# Patient Record
Sex: FEMALE | Race: WHITE | NOT HISPANIC OR LATINO
[De-identification: names, ages, dates, MRNs, and addresses within clinical notes are randomized per-mention and may not be internally consistent; named-entity substitution may affect disease eponyms.]

---

## 2017-02-20 ENCOUNTER — APPOINTMENT (OUTPATIENT)
Dept: OBGYN | Facility: CLINIC | Age: 52
End: 2017-02-20

## 2017-02-20 VITALS
DIASTOLIC BLOOD PRESSURE: 72 MMHG | BODY MASS INDEX: 20.83 KG/M2 | HEIGHT: 64 IN | WEIGHT: 122 LBS | SYSTOLIC BLOOD PRESSURE: 146 MMHG

## 2017-02-20 DIAGNOSIS — Z80.3 FAMILY HISTORY OF MALIGNANT NEOPLASM OF BREAST: ICD-10-CM

## 2017-02-20 DIAGNOSIS — G47.00 INSOMNIA, UNSPECIFIED: ICD-10-CM

## 2017-02-20 DIAGNOSIS — Z87.891 PERSONAL HISTORY OF NICOTINE DEPENDENCE: ICD-10-CM

## 2017-02-20 LAB
DATE COLLECTED: NORMAL
HEMOCCULT SP1 STL QL: NEGATIVE
QUALITY CONTROL: YES

## 2017-02-20 RX ORDER — CIPROFLOXACIN HYDROCHLORIDE 500 MG/1
500 TABLET, FILM COATED ORAL
Qty: 21 | Refills: 0 | Status: ACTIVE | COMMUNITY
Start: 2017-01-12

## 2017-02-24 LAB
CYTOLOGY CVX/VAG DOC THIN PREP: NORMAL
HPV HIGH+LOW RISK DNA PNL CVX: NEGATIVE

## 2017-03-09 ENCOUNTER — APPOINTMENT (OUTPATIENT)
Dept: OBGYN | Facility: CLINIC | Age: 52
End: 2017-03-09

## 2017-03-09 DIAGNOSIS — R93.8 ABNORMAL FINDINGS ON DIAGNOSTIC IMAGING OF OTHER SPECIFIED BODY STRUCTURES: ICD-10-CM

## 2017-03-13 PROBLEM — R93.8 THICKENED ENDOMETRIUM: Status: ACTIVE | Noted: 2017-03-13

## 2017-05-15 ENCOUNTER — APPOINTMENT (OUTPATIENT)
Dept: OBGYN | Facility: CLINIC | Age: 52
End: 2017-05-15

## 2017-07-05 ENCOUNTER — APPOINTMENT (OUTPATIENT)
Dept: OBGYN | Facility: CLINIC | Age: 52
End: 2017-07-05

## 2017-08-22 ENCOUNTER — RX RENEWAL (OUTPATIENT)
Age: 52
End: 2017-08-22

## 2021-12-06 ENCOUNTER — APPOINTMENT (OUTPATIENT)
Dept: BREAST CENTER | Facility: CLINIC | Age: 56
End: 2021-12-06

## 2023-06-06 ENCOUNTER — TRANSCRIPTION ENCOUNTER (OUTPATIENT)
Age: 58
End: 2023-06-06

## 2023-06-06 ENCOUNTER — APPOINTMENT (OUTPATIENT)
Dept: COLORECTAL SURGERY | Facility: CLINIC | Age: 58
End: 2023-06-06
Payer: COMMERCIAL

## 2023-06-06 VITALS
DIASTOLIC BLOOD PRESSURE: 81 MMHG | HEIGHT: 64 IN | WEIGHT: 132 LBS | SYSTOLIC BLOOD PRESSURE: 119 MMHG | RESPIRATION RATE: 14 BRPM | BODY MASS INDEX: 22.53 KG/M2 | HEART RATE: 123 BPM

## 2023-06-06 DIAGNOSIS — K57.80 DIVERTICULITIS OF INTESTINE, PART UNSPECIFIED, WITH PERFORATION AND ABSCESS W/OUT BLEEDING: ICD-10-CM

## 2023-06-06 PROCEDURE — 99204 OFFICE O/P NEW MOD 45 MIN: CPT

## 2023-06-06 NOTE — PHYSICAL EXAM
[Normal Breath Sounds] : Normal breath sounds [No Rash or Lesion] : No rash or lesion [Alert] : alert [Oriented to Person] : oriented to person [Oriented to Place] : oriented to place [Oriented to Time] : oriented to time [Calm] : calm [Abdomen Masses] : No abdominal masses [Abdomen Tenderness] : ~T No ~M abdominal tenderness [JVD] : no jugular venous distention  [de-identified] : looks well NAD

## 2023-06-06 NOTE — ASSESSMENT
[FreeTextEntry1] : 58 year old female with recurrent sigmoid diverticulitis including complicated episode with abscess. i have had a lengthy discussion with her and explained that given the numerous episodes of sigmoid diverticultis within a short period of time (6 months) and including a complicated episode with abscess i recoomend a laparoscopic sigmoid colon resection. risk and benefits include bleeding, infections, sepsis, dvt, pe, mi, hernias, recurrence, leak from anastamosis, need for stoma and death.

## 2023-06-06 NOTE — HISTORY OF PRESENT ILLNESS
[FreeTextEntry1] : Initial office visit for this 58 year old female with recurrent sigmoid diverticulitis with complication of abscess. she has had 4 episodes of diverticulitis since january of this year including one with abscess. she has been treated with extensive antibiotics.

## 2023-06-06 NOTE — CONSULT LETTER
[Consult Letter:] : I had the pleasure of evaluating your patient, [unfilled]. [Please see my note below.] : Please see my note below. [Consult Closing:] : Thank you very much for allowing me to participate in the care of this patient.  If you have any questions, please do not hesitate to contact me. [Sincerely,] : Sincerely, [Dear  ___] : Dear  [unfilled], [FreeTextEntry3] : Theodore Knowles MD

## 2023-08-01 ENCOUNTER — RESULT REVIEW (OUTPATIENT)
Age: 58
End: 2023-08-01

## 2023-08-01 ENCOUNTER — OUTPATIENT (OUTPATIENT)
Dept: OUTPATIENT SERVICES | Facility: HOSPITAL | Age: 58
LOS: 1 days | End: 2023-08-01
Payer: COMMERCIAL

## 2023-08-01 VITALS
HEART RATE: 94 BPM | WEIGHT: 134.04 LBS | OXYGEN SATURATION: 100 % | SYSTOLIC BLOOD PRESSURE: 149 MMHG | TEMPERATURE: 99 F | RESPIRATION RATE: 16 BRPM | HEIGHT: 63 IN | DIASTOLIC BLOOD PRESSURE: 82 MMHG

## 2023-08-01 DIAGNOSIS — Z01.818 ENCOUNTER FOR OTHER PREPROCEDURAL EXAMINATION: ICD-10-CM

## 2023-08-01 DIAGNOSIS — Z90.49 ACQUIRED ABSENCE OF OTHER SPECIFIED PARTS OF DIGESTIVE TRACT: Chronic | ICD-10-CM

## 2023-08-01 DIAGNOSIS — Z98.890 OTHER SPECIFIED POSTPROCEDURAL STATES: Chronic | ICD-10-CM

## 2023-08-01 DIAGNOSIS — Z87.19 PERSONAL HISTORY OF OTHER DISEASES OF THE DIGESTIVE SYSTEM: ICD-10-CM

## 2023-08-01 LAB
ABO RH CONFIRMATION: SIGNIFICANT CHANGE UP
ANION GAP SERPL CALC-SCNC: 3 MMOL/L — LOW (ref 5–17)
APTT BLD: 32.7 SEC — SIGNIFICANT CHANGE UP (ref 24.5–35.6)
BASOPHILS # BLD AUTO: 0.12 K/UL — SIGNIFICANT CHANGE UP (ref 0–0.2)
BASOPHILS NFR BLD AUTO: 1.4 % — SIGNIFICANT CHANGE UP (ref 0–2)
BLD GP AB SCN SERPL QL: SIGNIFICANT CHANGE UP
BUN SERPL-MCNC: 11 MG/DL — SIGNIFICANT CHANGE UP (ref 7–23)
CALCIUM SERPL-MCNC: 9.6 MG/DL — SIGNIFICANT CHANGE UP (ref 8.5–10.1)
CHLORIDE SERPL-SCNC: 105 MMOL/L — SIGNIFICANT CHANGE UP (ref 96–108)
CO2 SERPL-SCNC: 32 MMOL/L — HIGH (ref 22–31)
CREAT SERPL-MCNC: 1 MG/DL — SIGNIFICANT CHANGE UP (ref 0.5–1.3)
EGFR: 65 ML/MIN/1.73M2 — SIGNIFICANT CHANGE UP
EOSINOPHIL # BLD AUTO: 0.12 K/UL — SIGNIFICANT CHANGE UP (ref 0–0.5)
EOSINOPHIL NFR BLD AUTO: 1.4 % — SIGNIFICANT CHANGE UP (ref 0–6)
GLUCOSE SERPL-MCNC: 90 MG/DL — SIGNIFICANT CHANGE UP (ref 70–99)
HCT VFR BLD CALC: 48.3 % — HIGH (ref 34.5–45)
HGB BLD-MCNC: 16.1 G/DL — HIGH (ref 11.5–15.5)
IMM GRANULOCYTES NFR BLD AUTO: 0.3 % — SIGNIFICANT CHANGE UP (ref 0–0.9)
INR BLD: 0.97 RATIO — SIGNIFICANT CHANGE UP (ref 0.85–1.18)
LYMPHOCYTES # BLD AUTO: 1.41 K/UL — SIGNIFICANT CHANGE UP (ref 1–3.3)
LYMPHOCYTES # BLD AUTO: 15.9 % — SIGNIFICANT CHANGE UP (ref 13–44)
MCHC RBC-ENTMCNC: 30 PG — SIGNIFICANT CHANGE UP (ref 27–34)
MCHC RBC-ENTMCNC: 33.3 GM/DL — SIGNIFICANT CHANGE UP (ref 32–36)
MCV RBC AUTO: 89.9 FL — SIGNIFICANT CHANGE UP (ref 80–100)
MONOCYTES # BLD AUTO: 0.56 K/UL — SIGNIFICANT CHANGE UP (ref 0–0.9)
MONOCYTES NFR BLD AUTO: 6.3 % — SIGNIFICANT CHANGE UP (ref 2–14)
NEUTROPHILS # BLD AUTO: 6.64 K/UL — SIGNIFICANT CHANGE UP (ref 1.8–7.4)
NEUTROPHILS NFR BLD AUTO: 74.7 % — SIGNIFICANT CHANGE UP (ref 43–77)
PLATELET # BLD AUTO: 385 K/UL — SIGNIFICANT CHANGE UP (ref 150–400)
POTASSIUM SERPL-MCNC: 4 MMOL/L — SIGNIFICANT CHANGE UP (ref 3.5–5.3)
POTASSIUM SERPL-SCNC: 4 MMOL/L — SIGNIFICANT CHANGE UP (ref 3.5–5.3)
PROTHROM AB SERPL-ACNC: 11 SEC — SIGNIFICANT CHANGE UP (ref 9.5–13)
RBC # BLD: 5.37 M/UL — HIGH (ref 3.8–5.2)
RBC # FLD: 14.8 % — HIGH (ref 10.3–14.5)
SODIUM SERPL-SCNC: 140 MMOL/L — SIGNIFICANT CHANGE UP (ref 135–145)
WBC # BLD: 8.88 K/UL — SIGNIFICANT CHANGE UP (ref 3.8–10.5)
WBC # FLD AUTO: 8.88 K/UL — SIGNIFICANT CHANGE UP (ref 3.8–10.5)

## 2023-08-01 PROCEDURE — 36415 COLL VENOUS BLD VENIPUNCTURE: CPT

## 2023-08-01 PROCEDURE — 86850 RBC ANTIBODY SCREEN: CPT

## 2023-08-01 PROCEDURE — 93010 ELECTROCARDIOGRAM REPORT: CPT

## 2023-08-01 PROCEDURE — 80048 BASIC METABOLIC PNL TOTAL CA: CPT

## 2023-08-01 PROCEDURE — 85025 COMPLETE CBC W/AUTO DIFF WBC: CPT

## 2023-08-01 PROCEDURE — 85610 PROTHROMBIN TIME: CPT

## 2023-08-01 PROCEDURE — 71046 X-RAY EXAM CHEST 2 VIEWS: CPT

## 2023-08-01 PROCEDURE — 85730 THROMBOPLASTIN TIME PARTIAL: CPT

## 2023-08-01 PROCEDURE — 71046 X-RAY EXAM CHEST 2 VIEWS: CPT | Mod: 26

## 2023-08-01 PROCEDURE — 86900 BLOOD TYPING SEROLOGIC ABO: CPT

## 2023-08-01 PROCEDURE — 86901 BLOOD TYPING SEROLOGIC RH(D): CPT

## 2023-08-01 PROCEDURE — 93005 ELECTROCARDIOGRAM TRACING: CPT

## 2023-08-01 PROCEDURE — 99214 OFFICE O/P EST MOD 30 MIN: CPT | Mod: 25

## 2023-08-01 NOTE — H&P PST ADULT - GASTROINTESTINAL
details… soft/nontender/normal active bowel sounds soft/nontender/nondistended/normal active bowel sounds

## 2023-08-01 NOTE — H&P PST ADULT - NSICDXFAMILYHX_GEN_ALL_CORE_FT
FAMILY HISTORY:  Mother  Still living? Unknown  Family history of breast cancer in mother, Age at diagnosis: Age Unknown    Aunt  Still living? Unknown  Family history of breast cancer in mother, Age at diagnosis: Age Unknown

## 2023-08-01 NOTE — H&P PST ADULT - HISTORY OF PRESENT ILLNESS
59 y/o female presents to PST for scheduled colon resection laparoscopic on 8/9/23.  59 y/o female with hx HTN presents to [facility] for scheduled colon resection laparoscopic on 8/9/23.  Patient with hx of diverticulitis recent flare up 5/2023 with abscess. Patient followed up outpatient and was advised to have procedure.

## 2023-08-01 NOTE — H&P PST ADULT - PROBLEM SELECTOR PLAN 1
Pre op and chlorhexidine instructions given and explained.  Avoid NSAIDs and OTC supplements.   Patient verbalized understanding  medical consult requested by surgeon Pre op and chlorhexidine instructions given and explained.  Avoid NSAIDs and OTC supplements.   Patient verbalized understanding  medical consult requested by surgeon  no medication on the DOS due to water pill combination

## 2023-08-01 NOTE — H&P PST ADULT - ASSESSMENT
59 y/o female presents to PST for scheduled colon resection laparoscopic on 23.     CAPRINI SCORE    AGE RELATED RISK FACTORS                                                       MOBILITY RELATED FACTORS  [ ] Age 41-60 years                                            (1 Point)                  [ ] Bed rest                                                        (1 Point)  [ ] Age: 61-74 years                                           (2 Points)                [ ] Plaster cast                                                   (2 Points)  [ ] Age= 75 years                                              (3 Points)                 [ ] Bed bound for more than 72 hours                   (2 Points)    DISEASE RELATED RISK FACTORS                                               GENDER SPECIFIC FACTORS  [ ] Edema in the lower extremities                       (1 Point)                  [ ] Pregnancy                                                     (1 Point)  [ ] Varicose veins                                               (1 Point)                  [ ] Post-partum < 6 weeks                                   (1 Point)             [ ] BMI > 25 Kg/m2                                            (1 Point)                  [ ] Hormonal therapy  or oral contraception            (1 Point)                 [ ] Sepsis (in the previous month)                        (1 Point)                  [ ] History of pregnancy complications  [ ] Pneumonia or serious lung disease                                               [ ] Unexplained or recurrent                       (1 Point)           (in the previous month)                               (1 Point)  [ ] Abnormal pulmonary function test                     (1 Point)                 SURGERY RELATED RISK FACTORS  [ ] Acute myocardial infarction                              (1 Point)                 [ ]  Section                                            (1 Point)  [ ] Congestive heart failure (in the previous month)  (1 Point)                 [ ] Minor surgery                                                 (1 Point)   [ ] Inflammatory bowel disease                             (1 Point)                 [ ] Arthroscopic surgery                                        (2 Points)  [ ] Central venous access                                    (2 Points)                [ ] General surgery lasting more than 45 minutes   (2 Points)       [ ] Stroke (in the previous month)                          (5 Points)               [ ] Elective arthroplasty                                        (5 Points)            [ ] malignancy                                                             (2 points)                                                                                                                                 HEMATOLOGY RELATED FACTORS                                                 TRAUMA RELATED RISK FACTORS  [ ] Prior episodes of VTE                                     (3 Points)                 [ ] Fracture of the hip, pelvis, or leg                       (5 Points)  [ ] Positive family history for VTE                         (3 Points)                 [ ] Acute spinal cord injury (in the previous month)  (5 Points)  [ ] Prothrombin 34760 A                                      (3 Points)                 [ ] Paralysis  (less than 1 month)                          (5 Points)  [ ] Factor V Leiden                                             (3 Points)                 [ ] Multiple Trauma within 1 month                         (5 Points)  [ ] Lupus anticoagulants                                     (3 Points)                                                           [ ] Anticardiolipin antibodies                                (3 Points)                                                       [ ] High homocysteine in the blood                      (3 Points)                                             [ ] Other congenital or acquired thrombophilia       (3 Points)                                                [ ] Heparin induced thrombocytopenia                  (3 Points)                                          Total Score [          ] 57 y/o female presents to PST for scheduled colon resection laparoscopic on 23.     CAPRINI SCORE    AGE RELATED RISK FACTORS                                                       MOBILITY RELATED FACTORS  [x ] Age 41-60 years                                            (1 Point)                  [ ] Bed rest                                                        (1 Point)  [ ] Age: 61-74 years                                           (2 Points)                [ ] Plaster cast                                                   (2 Points)  [ ] Age= 75 years                                              (3 Points)                 [ ] Bed bound for more than 72 hours                   (2 Points)    DISEASE RELATED RISK FACTORS                                               GENDER SPECIFIC FACTORS  [ ] Edema in the lower extremities                       (1 Point)                  [ ] Pregnancy                                                     (1 Point)  [ ] Varicose veins                                               (1 Point)                  [ ] Post-partum < 6 weeks                                   (1 Point)             [ ] BMI > 25 Kg/m2                                            (1 Point)                  [ ] Hormonal therapy  or oral contraception            (1 Point)                 [ ] Sepsis (in the previous month)                        (1 Point)                  [ ] History of pregnancy complications  [ ] Pneumonia or serious lung disease                                               [ ] Unexplained or recurrent                       (1 Point)           (in the previous month)                               (1 Point)  [ ] Abnormal pulmonary function test                     (1 Point)                 SURGERY RELATED RISK FACTORS  [ ] Acute myocardial infarction                              (1 Point)                 [ ]  Section                                            (1 Point)  [ ] Congestive heart failure (in the previous month)  (1 Point)                 [ ] Minor surgery                                                 (1 Point)   [ ] Inflammatory bowel disease                             (1 Point)                 [ ] Arthroscopic surgery                                        (2 Points)  [ ] Central venous access                                    (2 Points)                [x ] General surgery lasting more than 45 minutes   (2 Points)       [ ] Stroke (in the previous month)                          (5 Points)               [ ] Elective arthroplasty                                        (5 Points)            [ ] malignancy                                                             (2 points)                                                                                                                                 HEMATOLOGY RELATED FACTORS                                                 TRAUMA RELATED RISK FACTORS  [ ] Prior episodes of VTE                                     (3 Points)                 [ ] Fracture of the hip, pelvis, or leg                       (5 Points)  [ ] Positive family history for VTE                         (3 Points)                 [ ] Acute spinal cord injury (in the previous month)  (5 Points)  [ ] Prothrombin 98803 A                                      (3 Points)                 [ ] Paralysis  (less than 1 month)                          (5 Points)  [ ] Factor V Leiden                                             (3 Points)                 [ ] Multiple Trauma within 1 month                         (5 Points)  [ ] Lupus anticoagulants                                     (3 Points)                                                           [ ] Anticardiolipin antibodies                                (3 Points)                                                       [ ] High homocysteine in the blood                      (3 Points)                                             [ ] Other congenital or acquired thrombophilia       (3 Points)                                                [ ] Heparin induced thrombocytopenia                  (3 Points)                                          Total Score [  3        ]  The Caprini score indicates this patient is at risk for a VTE event (score 3-5).  Most surgical patients in this group would benefit from pharmacologic prophylaxis.  The surgical team will determine the balance between VTE risk and bleeding risk

## 2023-08-01 NOTE — H&P PST ADULT - NSICDXPASTMEDICALHX_GEN_ALL_CORE_FT
PAST MEDICAL HISTORY:  History of diverticulitis      PAST MEDICAL HISTORY:  H/O osteopenia     History of diverticulitis     HTN (hypertension)

## 2023-08-02 DIAGNOSIS — Z01.818 ENCOUNTER FOR OTHER PREPROCEDURAL EXAMINATION: ICD-10-CM

## 2023-08-03 PROBLEM — Z87.19 PERSONAL HISTORY OF OTHER DISEASES OF THE DIGESTIVE SYSTEM: Chronic | Status: ACTIVE | Noted: 2023-08-01

## 2023-08-03 PROBLEM — Z87.39 PERSONAL HISTORY OF OTHER DISEASES OF THE MUSCULOSKELETAL SYSTEM AND CONNECTIVE TISSUE: Chronic | Status: ACTIVE | Noted: 2023-08-01

## 2023-08-03 PROBLEM — I10 ESSENTIAL (PRIMARY) HYPERTENSION: Chronic | Status: ACTIVE | Noted: 2023-08-01

## 2023-08-03 RX ORDER — SODIUM CHLORIDE 9 MG/ML
3 INJECTION INTRAMUSCULAR; INTRAVENOUS; SUBCUTANEOUS EVERY 8 HOURS
Refills: 0 | Status: DISCONTINUED | OUTPATIENT
Start: 2023-08-09 | End: 2023-08-11

## 2023-08-06 RX ORDER — METRONIDAZOLE 500 MG/1
500 TABLET ORAL
Qty: 6 | Refills: 0 | Status: ACTIVE | COMMUNITY
Start: 2023-08-06 | End: 1900-01-01

## 2023-08-06 RX ORDER — ERYTHROMYCIN 500 MG/1
500 TABLET, FILM COATED ORAL
Qty: 6 | Refills: 0 | Status: ACTIVE | COMMUNITY
Start: 2023-08-06 | End: 1900-01-01

## 2023-08-09 ENCOUNTER — TRANSCRIPTION ENCOUNTER (OUTPATIENT)
Age: 58
End: 2023-08-09

## 2023-08-09 ENCOUNTER — APPOINTMENT (OUTPATIENT)
Dept: COLORECTAL SURGERY | Facility: HOSPITAL | Age: 58
End: 2023-08-09

## 2023-08-09 ENCOUNTER — RESULT REVIEW (OUTPATIENT)
Age: 58
End: 2023-08-09

## 2023-08-09 ENCOUNTER — INPATIENT (INPATIENT)
Facility: HOSPITAL | Age: 58
LOS: 1 days | Discharge: HOME | DRG: 329 | End: 2023-08-11
Attending: COLON & RECTAL SURGERY | Admitting: COLON & RECTAL SURGERY
Payer: COMMERCIAL

## 2023-08-09 VITALS
WEIGHT: 132.06 LBS | OXYGEN SATURATION: 100 % | HEIGHT: 63 IN | TEMPERATURE: 98 F | DIASTOLIC BLOOD PRESSURE: 97 MMHG | HEART RATE: 100 BPM | RESPIRATION RATE: 18 BRPM | SYSTOLIC BLOOD PRESSURE: 142 MMHG

## 2023-08-09 DIAGNOSIS — Z98.890 OTHER SPECIFIED POSTPROCEDURAL STATES: Chronic | ICD-10-CM

## 2023-08-09 DIAGNOSIS — K57.80 DIVERTICULITIS OF INTESTINE, PART UNSPECIFIED, WITH PERFORATION AND ABSCESS WITHOUT BLEEDING: ICD-10-CM

## 2023-08-09 DIAGNOSIS — Z90.49 ACQUIRED ABSENCE OF OTHER SPECIFIED PARTS OF DIGESTIVE TRACT: Chronic | ICD-10-CM

## 2023-08-09 LAB
GLUCOSE BLDC GLUCOMTR-MCNC: 143 MG/DL — HIGH (ref 70–99)
GLUCOSE BLDC GLUCOMTR-MCNC: 182 MG/DL — HIGH (ref 70–99)
GLUCOSE BLDC GLUCOMTR-MCNC: 187 MG/DL — HIGH (ref 70–99)
GLUCOSE BLDC GLUCOMTR-MCNC: 200 MG/DL — HIGH (ref 70–99)

## 2023-08-09 PROCEDURE — 88304 TISSUE EXAM BY PATHOLOGIST: CPT

## 2023-08-09 PROCEDURE — 44207 L COLECTOMY/COLOPROCTOSTOMY: CPT | Mod: AS

## 2023-08-09 PROCEDURE — 99253 IP/OBS CNSLTJ NEW/EST LOW 45: CPT

## 2023-08-09 PROCEDURE — 85025 COMPLETE CBC W/AUTO DIFF WBC: CPT

## 2023-08-09 PROCEDURE — 44213 LAP MOBIL SPLENIC FL ADD-ON: CPT

## 2023-08-09 PROCEDURE — C9290: CPT

## 2023-08-09 PROCEDURE — 44207 L COLECTOMY/COLOPROCTOSTOMY: CPT

## 2023-08-09 PROCEDURE — 82962 GLUCOSE BLOOD TEST: CPT

## 2023-08-09 PROCEDURE — 44213 LAP MOBIL SPLENIC FL ADD-ON: CPT | Mod: AS

## 2023-08-09 PROCEDURE — 36415 COLL VENOUS BLD VENIPUNCTURE: CPT

## 2023-08-09 PROCEDURE — 45300 PROCTOSIGMOIDOSCOPY DX: CPT

## 2023-08-09 PROCEDURE — 83735 ASSAY OF MAGNESIUM: CPT

## 2023-08-09 PROCEDURE — 88307 TISSUE EXAM BY PATHOLOGIST: CPT | Mod: 26

## 2023-08-09 PROCEDURE — 85027 COMPLETE CBC AUTOMATED: CPT

## 2023-08-09 PROCEDURE — C1889: CPT

## 2023-08-09 PROCEDURE — C9399: CPT

## 2023-08-09 PROCEDURE — 88307 TISSUE EXAM BY PATHOLOGIST: CPT

## 2023-08-09 PROCEDURE — 88304 TISSUE EXAM BY PATHOLOGIST: CPT | Mod: 26

## 2023-08-09 PROCEDURE — 80048 BASIC METABOLIC PNL TOTAL CA: CPT

## 2023-08-09 PROCEDURE — 84100 ASSAY OF PHOSPHORUS: CPT

## 2023-08-09 RX ORDER — SODIUM CHLORIDE 9 MG/ML
1000 INJECTION, SOLUTION INTRAVENOUS
Refills: 0 | Status: DISCONTINUED | OUTPATIENT
Start: 2023-08-09 | End: 2023-08-10

## 2023-08-09 RX ORDER — CHOLECALCIFEROL (VITAMIN D3) 125 MCG
1 CAPSULE ORAL
Refills: 0 | DISCHARGE

## 2023-08-09 RX ORDER — FENTANYL CITRATE 50 UG/ML
50 INJECTION INTRAVENOUS
Refills: 0 | Status: DISCONTINUED | OUTPATIENT
Start: 2023-08-09 | End: 2023-08-09

## 2023-08-09 RX ORDER — OXYCODONE HYDROCHLORIDE 5 MG/1
10 TABLET ORAL ONCE
Refills: 0 | Status: DISCONTINUED | OUTPATIENT
Start: 2023-08-09 | End: 2023-08-09

## 2023-08-09 RX ORDER — ONDANSETRON 8 MG/1
4 TABLET, FILM COATED ORAL ONCE
Refills: 0 | Status: COMPLETED | OUTPATIENT
Start: 2023-08-09 | End: 2023-08-09

## 2023-08-09 RX ORDER — L.ACIDOPH/B.ANIMALIS/B.LONGUM 15B CELL
1 CAPSULE ORAL
Refills: 0 | DISCHARGE

## 2023-08-09 RX ORDER — CEFOTETAN DISODIUM 1 G
2 VIAL (EA) INJECTION EVERY 12 HOURS
Refills: 0 | Status: DISCONTINUED | OUTPATIENT
Start: 2023-08-09 | End: 2023-08-11

## 2023-08-09 RX ORDER — OXYCODONE HYDROCHLORIDE 5 MG/1
5 TABLET ORAL EVERY 4 HOURS
Refills: 0 | Status: DISCONTINUED | OUTPATIENT
Start: 2023-08-09 | End: 2023-08-11

## 2023-08-09 RX ORDER — VALSARTAN 80 MG/1
160 TABLET ORAL DAILY
Refills: 0 | Status: DISCONTINUED | OUTPATIENT
Start: 2023-08-09 | End: 2023-08-11

## 2023-08-09 RX ORDER — ALVIMOPAN 12 MG/1
12 CAPSULE ORAL EVERY 12 HOURS
Refills: 0 | Status: DISCONTINUED | OUTPATIENT
Start: 2023-08-09 | End: 2023-08-11

## 2023-08-09 RX ORDER — SODIUM CHLORIDE 9 MG/ML
1000 INJECTION, SOLUTION INTRAVENOUS
Refills: 0 | Status: DISCONTINUED | OUTPATIENT
Start: 2023-08-09 | End: 2023-08-09

## 2023-08-09 RX ORDER — ACETAMINOPHEN 500 MG
1000 TABLET ORAL EVERY 6 HOURS
Refills: 0 | Status: COMPLETED | OUTPATIENT
Start: 2023-08-09 | End: 2023-08-10

## 2023-08-09 RX ORDER — APREPITANT 80 MG/1
40 CAPSULE ORAL ONCE
Refills: 0 | Status: COMPLETED | OUTPATIENT
Start: 2023-08-09 | End: 2023-08-09

## 2023-08-09 RX ORDER — ONDANSETRON 8 MG/1
4 TABLET, FILM COATED ORAL ONCE
Refills: 0 | Status: DISCONTINUED | OUTPATIENT
Start: 2023-08-09 | End: 2023-08-09

## 2023-08-09 RX ORDER — ENOXAPARIN SODIUM 100 MG/ML
40 INJECTION SUBCUTANEOUS EVERY 24 HOURS
Refills: 0 | Status: DISCONTINUED | OUTPATIENT
Start: 2023-08-10 | End: 2023-08-11

## 2023-08-09 RX ORDER — OXYCODONE HYDROCHLORIDE 5 MG/1
10 TABLET ORAL EVERY 4 HOURS
Refills: 0 | Status: DISCONTINUED | OUTPATIENT
Start: 2023-08-09 | End: 2023-08-11

## 2023-08-09 RX ORDER — DENOSUMAB 60 MG/ML
60 INJECTION SUBCUTANEOUS
Refills: 0 | DISCHARGE

## 2023-08-09 RX ORDER — ALVIMOPAN 12 MG/1
12 CAPSULE ORAL ONCE
Refills: 0 | Status: COMPLETED | OUTPATIENT
Start: 2023-08-09 | End: 2023-08-09

## 2023-08-09 RX ORDER — FAMOTIDINE 10 MG/ML
20 INJECTION INTRAVENOUS ONCE
Refills: 0 | Status: COMPLETED | OUTPATIENT
Start: 2023-08-09 | End: 2023-08-09

## 2023-08-09 RX ORDER — HEPARIN SODIUM 5000 [USP'U]/ML
5000 INJECTION INTRAVENOUS; SUBCUTANEOUS ONCE
Refills: 0 | Status: COMPLETED | OUTPATIENT
Start: 2023-08-09 | End: 2023-08-09

## 2023-08-09 RX ADMIN — OXYCODONE HYDROCHLORIDE 10 MILLIGRAM(S): 5 TABLET ORAL at 11:32

## 2023-08-09 RX ADMIN — OXYCODONE HYDROCHLORIDE 10 MILLIGRAM(S): 5 TABLET ORAL at 21:00

## 2023-08-09 RX ADMIN — APREPITANT 40 MILLIGRAM(S): 80 CAPSULE ORAL at 07:48

## 2023-08-09 RX ADMIN — ALVIMOPAN 12 MILLIGRAM(S): 12 CAPSULE ORAL at 07:26

## 2023-08-09 RX ADMIN — Medication 400 MILLIGRAM(S): at 22:57

## 2023-08-09 RX ADMIN — FAMOTIDINE 20 MILLIGRAM(S): 10 INJECTION INTRAVENOUS at 18:15

## 2023-08-09 RX ADMIN — Medication 400 MILLIGRAM(S): at 17:54

## 2023-08-09 RX ADMIN — HEPARIN SODIUM 5000 UNIT(S): 5000 INJECTION INTRAVENOUS; SUBCUTANEOUS at 07:26

## 2023-08-09 RX ADMIN — FENTANYL CITRATE 50 MICROGRAM(S): 50 INJECTION INTRAVENOUS at 11:50

## 2023-08-09 RX ADMIN — FENTANYL CITRATE 50 MICROGRAM(S): 50 INJECTION INTRAVENOUS at 14:45

## 2023-08-09 RX ADMIN — OXYCODONE HYDROCHLORIDE 5 MILLIGRAM(S): 5 TABLET ORAL at 19:00

## 2023-08-09 RX ADMIN — Medication 1000 MILLIGRAM(S): at 19:00

## 2023-08-09 RX ADMIN — OXYCODONE HYDROCHLORIDE 5 MILLIGRAM(S): 5 TABLET ORAL at 16:18

## 2023-08-09 RX ADMIN — SODIUM CHLORIDE 3 MILLILITER(S): 9 INJECTION INTRAMUSCULAR; INTRAVENOUS; SUBCUTANEOUS at 21:54

## 2023-08-09 RX ADMIN — Medication 100 GRAM(S): at 22:57

## 2023-08-09 RX ADMIN — FENTANYL CITRATE 50 MICROGRAM(S): 50 INJECTION INTRAVENOUS at 13:40

## 2023-08-09 RX ADMIN — OXYCODONE HYDROCHLORIDE 10 MILLIGRAM(S): 5 TABLET ORAL at 20:31

## 2023-08-09 RX ADMIN — ONDANSETRON 4 MILLIGRAM(S): 8 TABLET, FILM COATED ORAL at 07:48

## 2023-08-09 RX ADMIN — OXYCODONE HYDROCHLORIDE 10 MILLIGRAM(S): 5 TABLET ORAL at 14:46

## 2023-08-09 RX ADMIN — FENTANYL CITRATE 50 MICROGRAM(S): 50 INJECTION INTRAVENOUS at 11:32

## 2023-08-09 RX ADMIN — ALVIMOPAN 12 MILLIGRAM(S): 12 CAPSULE ORAL at 20:31

## 2023-08-09 RX ADMIN — SODIUM CHLORIDE 125 MILLILITER(S): 9 INJECTION, SOLUTION INTRAVENOUS at 14:46

## 2023-08-09 NOTE — PATIENT PROFILE ADULT - FALL HARM RISK - UNIVERSAL INTERVENTIONS
Bed in lowest position, wheels locked, appropriate side rails in place/Call bell, personal items and telephone in reach/Instruct patient to call for assistance before getting out of bed or chair/Non-slip footwear when patient is out of bed/[place] to call system/Physically safe environment - no spills, clutter or unnecessary equipment/Purposeful Proactive Rounding/Room/bathroom lighting operational, light cord in reach

## 2023-08-09 NOTE — BRIEF OPERATIVE NOTE - NSICDXBRIEFPOSTOP_GEN_ALL_CORE_FT
POST-OP DIAGNOSIS:  Abscess of sigmoid colon due to diverticulitis 09-Aug-2023 10:56:57  Sergei Fernandes  Perforation of sigmoid colon due to diverticulitis 09-Aug-2023 10:57:13  Sergei Fernandes

## 2023-08-09 NOTE — BRIEF OPERATIVE NOTE - COMMENTS
I, ALDO Fernandes, was present with Dr. Knowles for the entirety of the procedure, assisting during all key portions of the procedure. For further details, please refer to his operative dictation.

## 2023-08-09 NOTE — BRIEF OPERATIVE NOTE - NSICDXBRIEFPREOP_GEN_ALL_CORE_FT
PRE-OP DIAGNOSIS:  Abscess of sigmoid colon due to diverticulitis 09-Aug-2023 10:56:53  Sergei Fernandes A

## 2023-08-09 NOTE — BRIEF OPERATIVE NOTE - NSICDXBRIEFPROCEDURE_GEN_ALL_CORE_FT
PROCEDURES:  Hand assisted laparoscopic sigmoid colectomy 09-Aug-2023 10:56:01  Sergei Fernandes  Mobilization of splenic flexure 09-Aug-2023 10:56:14  Sergei Fernandes  Rigid proctosigmoidoscpy 09-Aug-2023 10:56:22  Sergei Fernandes

## 2023-08-09 NOTE — BRIEF OPERATIVE NOTE - OPERATION/FINDINGS
thickened sigmoid, perforation, abscess of sigmoid diverticulitis. abscess in sigmoid mesentery in pelvis, proximal rectum with stricture

## 2023-08-09 NOTE — CONSULT NOTE ADULT - SUBJECTIVE AND OBJECTIVE BOX
C/c: h/o Diverticulitis with abscess    HPI: 58F, pmh of HTN, recurrent diverticulitis with recent flare up complicated by abscess who is admitted for elective hand assisted lap sigmoidectomy.  Hospitalist service consulted for medical comanagement.   Pt seen and examined in pacu. Felt well. Had just gotten medicated for pain. no n/v. Mccoy in place. Took valsartan-hctz this am.     ROS: all 10 systems reviewed and is as above otherwise negative.     PMH: as above  PSH: Cholecystectomy, hand surgery  F/H: Mother-breast ca  Social: Tobacco-15 pack year, quit 20 years ago, ETOH: 1-2 drinks 2-3X/week  Allergies: amoxicillin, sulfa  Home meds: see med rec    Vital Signs Last 24 Hrs  T(C): 37.2 (09 Aug 2023 11:11), Max: 37.2 (09 Aug 2023 11:11)  T(F): 99 (09 Aug 2023 11:11), Max: 99 (09 Aug 2023 11:11)  HR: 77 (09 Aug 2023 15:30) (71 - 100)  BP: 129/79 (09 Aug 2023 15:00) (111/81 - 185/85)  RR: 14 (09 Aug 2023 15:30) (10 - 18)  SpO2: 100% (09 Aug 2023 15:30) (95% - 100%)    Parameters below as of 09 Aug 2023 14:00  Patient On (Oxygen Delivery Method): nasal cannula  O2 Flow (L/min): 2    PHYSICAL EXAM:    GENERAL: Comfortable, no acute distress   HEAD:  Normocephalic, atraumatic  EYES: EOMI, PERRLA  HEENT: Moist mucous membranes  NECK: Supple, No JVD  NERVOUS SYSTEM:  Alert & Oriented X3, Motor Strength 5/5 B/L upper and lower extremities  CHEST/LUNG: Clear to auscultation bilaterally  HEART: Regular rate and rhythm  ABDOMEN: Soft, appropriate post op tenderness, midline dressing intact, lap sites intact  GENITOURINARY: Voiding, no palpable bladder  EXTREMITIES:   No clubbing, cyanosis, or edema  MUSCULOSKELETAL- No muscle tenderness, no joint tenderness  SKIN-no rash    LABS:  pending.     MEDICATIONS  (STANDING):  acetaminophen   IVPB .. 1000 milliGRAM(s) IV Intermittent every 6 hours  alvimopan 12 milliGRAM(s) Oral every 12 hours  cefoTEtan  IVPB 2 Gram(s) IV Intermittent every 12 hours  lactated ringers. 1000 milliLiter(s) (75 mL/Hr) IV Continuous <Continuous>  lactated ringers. 1000 milliLiter(s) (125 mL/Hr) IV Continuous <Continuous>  multivitamin 1 Tablet(s) Oral daily  valsartan 160 milliGRAM(s) Oral daily    MEDICATIONS  (PRN):  fentaNYL    Injectable 50 MICROGram(s) IV Push every 5 minutes PRN Severe Pain (7 - 10)  ondansetron Injectable 4 milliGRAM(s) IV Push once PRN Nausea and/or Vomiting  oxyCODONE    IR 5 milliGRAM(s) Oral every 4 hours PRN Moderate Pain (4 - 6)  oxyCODONE    IR 10 milliGRAM(s) Oral every 4 hours PRN Severe Pain (7 - 10)    ASSESSMENT AND PLAN:  58f, PMH as above a/w    1. H/o Recurrent sigmoid diverticulitis with recent flare/abscess:  -s/p hand assisted lap sigmoidectomy POD#0  -liquid diet  -pain control  -ivf  -incentive spirometry  -ambulate when feasible.   -f/u post op labs.    2. HTN:  -continue valsartan with hold parameters (already took dose today)  -hold hctz.    3. DVT px  -hep sq tonight.    thanks, will follow

## 2023-08-10 ENCOUNTER — TRANSCRIPTION ENCOUNTER (OUTPATIENT)
Age: 58
End: 2023-08-10

## 2023-08-10 LAB
ANION GAP SERPL CALC-SCNC: 5 MMOL/L — SIGNIFICANT CHANGE UP (ref 5–17)
BUN SERPL-MCNC: 5 MG/DL — LOW (ref 7–23)
CALCIUM SERPL-MCNC: 8.9 MG/DL — SIGNIFICANT CHANGE UP (ref 8.5–10.1)
CHLORIDE SERPL-SCNC: 101 MMOL/L — SIGNIFICANT CHANGE UP (ref 96–108)
CO2 SERPL-SCNC: 30 MMOL/L — SIGNIFICANT CHANGE UP (ref 22–31)
CREAT SERPL-MCNC: 1.09 MG/DL — SIGNIFICANT CHANGE UP (ref 0.5–1.3)
EGFR: 59 ML/MIN/1.73M2 — LOW
GLUCOSE BLDC GLUCOMTR-MCNC: 107 MG/DL — HIGH (ref 70–99)
GLUCOSE BLDC GLUCOMTR-MCNC: 109 MG/DL — HIGH (ref 70–99)
GLUCOSE BLDC GLUCOMTR-MCNC: 136 MG/DL — HIGH (ref 70–99)
GLUCOSE BLDC GLUCOMTR-MCNC: 157 MG/DL — HIGH (ref 70–99)
GLUCOSE SERPL-MCNC: 138 MG/DL — HIGH (ref 70–99)
HCT VFR BLD CALC: 41.4 % — SIGNIFICANT CHANGE UP (ref 34.5–45)
HGB BLD-MCNC: 14 G/DL — SIGNIFICANT CHANGE UP (ref 11.5–15.5)
MAGNESIUM SERPL-MCNC: 2 MG/DL — SIGNIFICANT CHANGE UP (ref 1.6–2.6)
MCHC RBC-ENTMCNC: 29.9 PG — SIGNIFICANT CHANGE UP (ref 27–34)
MCHC RBC-ENTMCNC: 33.8 GM/DL — SIGNIFICANT CHANGE UP (ref 32–36)
MCV RBC AUTO: 88.3 FL — SIGNIFICANT CHANGE UP (ref 80–100)
PHOSPHATE SERPL-MCNC: 2 MG/DL — LOW (ref 2.5–4.5)
PLATELET # BLD AUTO: 375 K/UL — SIGNIFICANT CHANGE UP (ref 150–400)
POTASSIUM SERPL-MCNC: 3.2 MMOL/L — LOW (ref 3.5–5.3)
POTASSIUM SERPL-SCNC: 3.2 MMOL/L — LOW (ref 3.5–5.3)
RBC # BLD: 4.69 M/UL — SIGNIFICANT CHANGE UP (ref 3.8–5.2)
RBC # FLD: 14.8 % — HIGH (ref 10.3–14.5)
SODIUM SERPL-SCNC: 136 MMOL/L — SIGNIFICANT CHANGE UP (ref 135–145)
WBC # BLD: 13.28 K/UL — HIGH (ref 3.8–10.5)
WBC # FLD AUTO: 13.28 K/UL — HIGH (ref 3.8–10.5)

## 2023-08-10 PROCEDURE — 99232 SBSQ HOSP IP/OBS MODERATE 35: CPT

## 2023-08-10 RX ORDER — OXYCODONE HYDROCHLORIDE 5 MG/1
1 TABLET ORAL
Qty: 20 | Refills: 0
Start: 2023-08-10

## 2023-08-10 RX ORDER — POTASSIUM CHLORIDE 20 MEQ
40 PACKET (EA) ORAL EVERY 4 HOURS
Refills: 0 | Status: COMPLETED | OUTPATIENT
Start: 2023-08-10 | End: 2023-08-10

## 2023-08-10 RX ADMIN — OXYCODONE HYDROCHLORIDE 5 MILLIGRAM(S): 5 TABLET ORAL at 16:29

## 2023-08-10 RX ADMIN — SODIUM CHLORIDE 125 MILLILITER(S): 9 INJECTION, SOLUTION INTRAVENOUS at 03:32

## 2023-08-10 RX ADMIN — Medication 100 GRAM(S): at 09:04

## 2023-08-10 RX ADMIN — ENOXAPARIN SODIUM 40 MILLIGRAM(S): 100 INJECTION SUBCUTANEOUS at 05:02

## 2023-08-10 RX ADMIN — OXYCODONE HYDROCHLORIDE 5 MILLIGRAM(S): 5 TABLET ORAL at 06:14

## 2023-08-10 RX ADMIN — Medication 85 MILLIMOLE(S): at 14:00

## 2023-08-10 RX ADMIN — OXYCODONE HYDROCHLORIDE 5 MILLIGRAM(S): 5 TABLET ORAL at 20:52

## 2023-08-10 RX ADMIN — OXYCODONE HYDROCHLORIDE 5 MILLIGRAM(S): 5 TABLET ORAL at 20:22

## 2023-08-10 RX ADMIN — OXYCODONE HYDROCHLORIDE 5 MILLIGRAM(S): 5 TABLET ORAL at 11:19

## 2023-08-10 RX ADMIN — ALVIMOPAN 12 MILLIGRAM(S): 12 CAPSULE ORAL at 21:43

## 2023-08-10 RX ADMIN — OXYCODONE HYDROCHLORIDE 5 MILLIGRAM(S): 5 TABLET ORAL at 10:19

## 2023-08-10 RX ADMIN — SODIUM CHLORIDE 3 MILLILITER(S): 9 INJECTION INTRAMUSCULAR; INTRAVENOUS; SUBCUTANEOUS at 12:10

## 2023-08-10 RX ADMIN — OXYCODONE HYDROCHLORIDE 5 MILLIGRAM(S): 5 TABLET ORAL at 15:52

## 2023-08-10 RX ADMIN — SODIUM CHLORIDE 3 MILLILITER(S): 9 INJECTION INTRAMUSCULAR; INTRAVENOUS; SUBCUTANEOUS at 05:48

## 2023-08-10 RX ADMIN — OXYCODONE HYDROCHLORIDE 10 MILLIGRAM(S): 5 TABLET ORAL at 21:52

## 2023-08-10 RX ADMIN — VALSARTAN 160 MILLIGRAM(S): 80 TABLET ORAL at 09:05

## 2023-08-10 RX ADMIN — SODIUM CHLORIDE 3 MILLILITER(S): 9 INJECTION INTRAMUSCULAR; INTRAVENOUS; SUBCUTANEOUS at 21:51

## 2023-08-10 RX ADMIN — Medication 1 TABLET(S): at 09:05

## 2023-08-10 RX ADMIN — Medication 40 MILLIEQUIVALENT(S): at 13:32

## 2023-08-10 RX ADMIN — Medication 400 MILLIGRAM(S): at 05:02

## 2023-08-10 RX ADMIN — OXYCODONE HYDROCHLORIDE 5 MILLIGRAM(S): 5 TABLET ORAL at 05:13

## 2023-08-10 RX ADMIN — Medication 1000 MILLIGRAM(S): at 00:58

## 2023-08-10 RX ADMIN — Medication 400 MILLIGRAM(S): at 13:31

## 2023-08-10 RX ADMIN — Medication 40 MILLIEQUIVALENT(S): at 17:10

## 2023-08-10 RX ADMIN — OXYCODONE HYDROCHLORIDE 10 MILLIGRAM(S): 5 TABLET ORAL at 22:22

## 2023-08-10 RX ADMIN — Medication 100 GRAM(S): at 21:45

## 2023-08-10 RX ADMIN — ALVIMOPAN 12 MILLIGRAM(S): 12 CAPSULE ORAL at 09:05

## 2023-08-10 RX ADMIN — Medication 1000 MILLIGRAM(S): at 05:25

## 2023-08-10 RX ADMIN — Medication 1000 MILLIGRAM(S): at 14:31

## 2023-08-10 NOTE — DISCHARGE NOTE PROVIDER - CARE PROVIDER_API CALL
Theodore Knowles  Colon/Rectal Surgery  [address], [place] [address]  Phone: [phone]  Fax: [phone]  Follow Up Time: 2 weeks

## 2023-08-10 NOTE — DISCHARGE NOTE PROVIDER - NSDCMRMEDTOKEN_GEN_ALL_CORE_FT
Multiple Vitamins oral tablet: 1 orally once a day  oxyCODONE 5 mg oral tablet: 1 tab(s) orally every 6 hours as needed for  moderate pain MDD: 004  Probiotic Formula oral capsule: 1 orally once a day  Prolia 60 mg/mL subcutaneous solution: 60 subcutaneously every 6 months  valsartan-hydrochlorothiazide 160 mg-12.5 mg oral tablet: 1 orally once a day  Vitamin D3 50 mcg (2000 intl units) oral capsule: 1 orally once a day

## 2023-08-10 NOTE — DISCHARGE NOTE PROVIDER - NSDCQMPCI_CARD_ALL_CORE
Nothing to eat or drink after midnight tonight!!    Dr. Elaine's office will call you in the morning if you do not hear from them by 9 AM call their office as listed below.    Take pain medication as needed and operate heavy machinery while taking.    Take Zofran as needed for nausea.   No

## 2023-08-10 NOTE — DISCHARGE NOTE PROVIDER - HOSPITAL COURSE
Pt admitted electively for sigmoid colon resection for complicated diverticulitis with abscess, perforation. Postop no major issues, pain controlled, stable vital signs, tolerating diet and return of bowel function.

## 2023-08-10 NOTE — PROGRESS NOTE ADULT - NUTRITIONAL ASSESSMENT
59 yo female s/p lap sigmoidectomy POD 1    Plan:   Advance to LRD once tolerating fulls for breakfast  Dc fluids once on LRD  entereg  DVT ppx  f/u AM labs  pain control PRN  monitor bowel function     Plan discussed with Dr. Knowles

## 2023-08-11 ENCOUNTER — TRANSCRIPTION ENCOUNTER (OUTPATIENT)
Age: 58
End: 2023-08-11

## 2023-08-11 VITALS
RESPIRATION RATE: 18 BRPM | TEMPERATURE: 99 F | SYSTOLIC BLOOD PRESSURE: 144 MMHG | DIASTOLIC BLOOD PRESSURE: 91 MMHG | OXYGEN SATURATION: 98 % | HEART RATE: 105 BPM

## 2023-08-11 LAB
ANION GAP SERPL CALC-SCNC: 6 MMOL/L — SIGNIFICANT CHANGE UP (ref 5–17)
BASOPHILS # BLD AUTO: 0.12 K/UL — SIGNIFICANT CHANGE UP (ref 0–0.2)
BASOPHILS NFR BLD AUTO: 1.1 % — SIGNIFICANT CHANGE UP (ref 0–2)
BUN SERPL-MCNC: 5 MG/DL — LOW (ref 7–23)
CALCIUM SERPL-MCNC: 8.8 MG/DL — SIGNIFICANT CHANGE UP (ref 8.5–10.1)
CHLORIDE SERPL-SCNC: 104 MMOL/L — SIGNIFICANT CHANGE UP (ref 96–108)
CO2 SERPL-SCNC: 29 MMOL/L — SIGNIFICANT CHANGE UP (ref 22–31)
CREAT SERPL-MCNC: 0.91 MG/DL — SIGNIFICANT CHANGE UP (ref 0.5–1.3)
EGFR: 73 ML/MIN/1.73M2 — SIGNIFICANT CHANGE UP
EOSINOPHIL # BLD AUTO: 0.16 K/UL — SIGNIFICANT CHANGE UP (ref 0–0.5)
EOSINOPHIL NFR BLD AUTO: 1.5 % — SIGNIFICANT CHANGE UP (ref 0–6)
GLUCOSE BLDC GLUCOMTR-MCNC: 96 MG/DL — SIGNIFICANT CHANGE UP (ref 70–99)
GLUCOSE SERPL-MCNC: 106 MG/DL — HIGH (ref 70–99)
HCT VFR BLD CALC: 42.2 % — SIGNIFICANT CHANGE UP (ref 34.5–45)
HGB BLD-MCNC: 13.9 G/DL — SIGNIFICANT CHANGE UP (ref 11.5–15.5)
IMM GRANULOCYTES NFR BLD AUTO: 0.3 % — SIGNIFICANT CHANGE UP (ref 0–0.9)
LYMPHOCYTES # BLD AUTO: 1.23 K/UL — SIGNIFICANT CHANGE UP (ref 1–3.3)
LYMPHOCYTES # BLD AUTO: 11.6 % — LOW (ref 13–44)
MAGNESIUM SERPL-MCNC: 2.4 MG/DL — SIGNIFICANT CHANGE UP (ref 1.6–2.6)
MCHC RBC-ENTMCNC: 29.3 PG — SIGNIFICANT CHANGE UP (ref 27–34)
MCHC RBC-ENTMCNC: 32.9 GM/DL — SIGNIFICANT CHANGE UP (ref 32–36)
MCV RBC AUTO: 89 FL — SIGNIFICANT CHANGE UP (ref 80–100)
MONOCYTES # BLD AUTO: 0.65 K/UL — SIGNIFICANT CHANGE UP (ref 0–0.9)
MONOCYTES NFR BLD AUTO: 6.1 % — SIGNIFICANT CHANGE UP (ref 2–14)
NEUTROPHILS # BLD AUTO: 8.38 K/UL — HIGH (ref 1.8–7.4)
NEUTROPHILS NFR BLD AUTO: 79.4 % — HIGH (ref 43–77)
PHOSPHATE SERPL-MCNC: 2 MG/DL — LOW (ref 2.5–4.5)
PLATELET # BLD AUTO: 330 K/UL — SIGNIFICANT CHANGE UP (ref 150–400)
POTASSIUM SERPL-MCNC: 3.6 MMOL/L — SIGNIFICANT CHANGE UP (ref 3.5–5.3)
POTASSIUM SERPL-SCNC: 3.6 MMOL/L — SIGNIFICANT CHANGE UP (ref 3.5–5.3)
RBC # BLD: 4.74 M/UL — SIGNIFICANT CHANGE UP (ref 3.8–5.2)
RBC # FLD: 14.9 % — HIGH (ref 10.3–14.5)
SODIUM SERPL-SCNC: 139 MMOL/L — SIGNIFICANT CHANGE UP (ref 135–145)
WBC # BLD: 10.57 K/UL — HIGH (ref 3.8–10.5)
WBC # FLD AUTO: 10.57 K/UL — HIGH (ref 3.8–10.5)

## 2023-08-11 PROCEDURE — 99232 SBSQ HOSP IP/OBS MODERATE 35: CPT

## 2023-08-11 RX ORDER — ACETAMINOPHEN 500 MG
1000 TABLET ORAL ONCE
Refills: 0 | Status: COMPLETED | OUTPATIENT
Start: 2023-08-11 | End: 2023-08-11

## 2023-08-11 RX ORDER — ONDANSETRON 8 MG/1
4 TABLET, FILM COATED ORAL EVERY 6 HOURS
Refills: 0 | Status: DISCONTINUED | OUTPATIENT
Start: 2023-08-11 | End: 2023-08-11

## 2023-08-11 RX ORDER — PANTOPRAZOLE SODIUM 20 MG/1
40 TABLET, DELAYED RELEASE ORAL
Refills: 0 | Status: DISCONTINUED | OUTPATIENT
Start: 2023-08-11 | End: 2023-08-11

## 2023-08-11 RX ADMIN — OXYCODONE HYDROCHLORIDE 5 MILLIGRAM(S): 5 TABLET ORAL at 15:22

## 2023-08-11 RX ADMIN — SODIUM CHLORIDE 3 MILLILITER(S): 9 INJECTION INTRAMUSCULAR; INTRAVENOUS; SUBCUTANEOUS at 09:43

## 2023-08-11 RX ADMIN — OXYCODONE HYDROCHLORIDE 5 MILLIGRAM(S): 5 TABLET ORAL at 05:22

## 2023-08-11 RX ADMIN — Medication 1000 MILLIGRAM(S): at 11:38

## 2023-08-11 RX ADMIN — OXYCODONE HYDROCHLORIDE 5 MILLIGRAM(S): 5 TABLET ORAL at 05:39

## 2023-08-11 RX ADMIN — VALSARTAN 160 MILLIGRAM(S): 80 TABLET ORAL at 09:32

## 2023-08-11 RX ADMIN — Medication 1 TABLET(S): at 09:32

## 2023-08-11 RX ADMIN — ENOXAPARIN SODIUM 40 MILLIGRAM(S): 100 INJECTION SUBCUTANEOUS at 05:23

## 2023-08-11 RX ADMIN — ALVIMOPAN 12 MILLIGRAM(S): 12 CAPSULE ORAL at 09:32

## 2023-08-11 RX ADMIN — Medication 100 GRAM(S): at 09:34

## 2023-08-11 RX ADMIN — PANTOPRAZOLE SODIUM 40 MILLIGRAM(S): 20 TABLET, DELAYED RELEASE ORAL at 06:24

## 2023-08-11 RX ADMIN — Medication 400 MILLIGRAM(S): at 10:14

## 2023-08-11 RX ADMIN — SODIUM CHLORIDE 3 MILLILITER(S): 9 INJECTION INTRAMUSCULAR; INTRAVENOUS; SUBCUTANEOUS at 05:12

## 2023-08-11 NOTE — CDI QUERY NOTE - NSCDIOTHERTXTBX_GEN_ALL_CORE_HH
This patient was admitted with diverticulitis with abscess. Your clarification is needed regarding the location of the abscess:    Discharge Note Provider 8-10-23 @ 14:55  Pt admitted electively for sigmoid colon resection for complicated diverticulitis with abscess, perforation.    Diagnosis: Abscess of sigmoid colon due to diverticulitis    Brief Operative Note 8-9-23 @ 10:54  Abscess of sigmoid colon due to diverticulitis     · Operative Findings  thickened sigmoid, perforation, abscess of sigmoid diverticulitis. abscess in sigmoid mesentery in pelvis, proximal rectum with stricture    Can the location of the abscess be clarified?  -Mesenteric abscess  -Intraabdominal abscess  -Intraluminal abscess  -Other, please specify location:

## 2023-08-11 NOTE — DISCHARGE NOTE NURSING/CASE MANAGEMENT/SOCIAL WORK - NSDCPEFALRISK_GEN_ALL_CORE
For information on Fall & Injury Prevention, visit: [email or web address] OR  [email or web address] OR  https://www.cdc.gov/steadi/patient.html

## 2023-08-11 NOTE — PROGRESS NOTE ADULT - ATTENDING COMMENTS
S&E  Passed diarrhea this AM.  Gricel full liquid diet.  AFVSS  NAD  soft, approp tender, mild distention  Labs reviewed  A/P-   Advance to low fiber diet.  Replete K.  HLIV.  D/C planning for tomorrow.
Patient seen and examined at bedside  Tolerating a diet, having bowel function, doing well overall  Plan for discharge home today

## 2023-08-11 NOTE — DISCHARGE NOTE NURSING/CASE MANAGEMENT/SOCIAL WORK - NSFLUVACAGEDISCH_IMM_ALL_CORE
Courtney Sumner incoming folder
Adult

## 2023-08-11 NOTE — DISCHARGE NOTE NURSING/CASE MANAGEMENT/SOCIAL WORK - PATIENT PORTAL LINK FT
You can access the FollowMyHealth Patient Portal offered by [facility] by registering at the following website: [email or web address]. By joining [employer]’s FollowMyHealth portal, you will also be able to view your health information using other applications (apps) compatible with our system.

## 2023-08-11 NOTE — PROGRESS NOTE ADULT - ASSESSMENT
57 yo female s/p lap sigmoidectomy POD 2    Plan:   Continue LRD  DVT ppx  f/u AM labs  pain control PRN  monitor bowel function   DC later today    Plan discussed with Dr. Knowles 59 yo female s/p lap sigmoidectomy POD 2    Plan:   Continue LRD  DVT ppx  f/u AM labs  pain control PRN  monitor bowel function   DC later today    Plan discussed with Dr. Cook 57 yo female s/p lap sigmoidectomy POD 2    Plan:   Continue LRD  DVT ppx  f/u AM labs  pain control PRN  monitor bowel function   DC later today

## 2023-08-11 NOTE — CDI QUERY NOTE - NSCDI_DOCCLARIFY_GEN_ALL_CORE_FT
I have reviewed patients results.  No anemia seen but blood cells are large, this can be seen a vitamin B deficiency.  Patient should take B12 or a b-complex vitamin over the counter.  Please contact patient to convey results.    Salina Sewell MD  
In responding to this request, please exercise your independent professional judgment.  The fact that a question is asked does not imply that any particular answer is desired or expected.

## 2023-08-13 ENCOUNTER — NON-APPOINTMENT (OUTPATIENT)
Age: 58
End: 2023-08-13

## 2023-08-14 LAB — SURGICAL PATHOLOGY STUDY: SIGNIFICANT CHANGE UP

## 2023-08-24 ENCOUNTER — APPOINTMENT (OUTPATIENT)
Dept: COLORECTAL SURGERY | Facility: CLINIC | Age: 58
End: 2023-08-24
Payer: COMMERCIAL

## 2023-08-24 VITALS
HEART RATE: 89 BPM | BODY MASS INDEX: 23.92 KG/M2 | SYSTOLIC BLOOD PRESSURE: 107 MMHG | DIASTOLIC BLOOD PRESSURE: 69 MMHG | OXYGEN SATURATION: 98 % | WEIGHT: 135 LBS | HEIGHT: 63 IN | RESPIRATION RATE: 14 BRPM | TEMPERATURE: 97.8 F

## 2023-08-24 DIAGNOSIS — Z09 ENCOUNTER FOR FOLLOW-UP EXAMINATION AFTER COMPLETED TREATMENT FOR CONDITIONS OTHER THAN MALIGNANT NEOPLASM: ICD-10-CM

## 2023-08-24 DIAGNOSIS — K65.1 PERITONEAL ABSCESS: ICD-10-CM

## 2023-08-24 DIAGNOSIS — Z88.2 ALLERGY STATUS TO SULFONAMIDES: ICD-10-CM

## 2023-08-24 DIAGNOSIS — E83.39 OTHER DISORDERS OF PHOSPHORUS METABOLISM: ICD-10-CM

## 2023-08-24 DIAGNOSIS — Z90.49 ACQUIRED ABSENCE OF OTHER SPECIFIED PARTS OF DIGESTIVE TRACT: ICD-10-CM

## 2023-08-24 DIAGNOSIS — K57.20 DIVERTICULITIS OF LARGE INTESTINE WITH PERFORATION AND ABSCESS WITHOUT BLEEDING: ICD-10-CM

## 2023-08-24 DIAGNOSIS — Z88.1 ALLERGY STATUS TO OTHER ANTIBIOTIC AGENTS STATUS: ICD-10-CM

## 2023-08-24 DIAGNOSIS — E87.6 HYPOKALEMIA: ICD-10-CM

## 2023-08-24 DIAGNOSIS — I10 ESSENTIAL (PRIMARY) HYPERTENSION: ICD-10-CM

## 2023-08-24 DIAGNOSIS — Z87.891 PERSONAL HISTORY OF NICOTINE DEPENDENCE: ICD-10-CM

## 2023-08-24 PROCEDURE — 99024 POSTOP FOLLOW-UP VISIT: CPT

## 2023-08-24 NOTE — PHYSICAL EXAM
[JVD] : no jugular venous distention  [Normal Breath Sounds] : Normal breath sounds [No Rash or Lesion] : No rash or lesion [Alert] : alert [Oriented to Person] : oriented to person [Oriented to Place] : oriented to place [Oriented to Time] : oriented to time [Calm] : calm [de-identified] : incision CDI, soft, NTND [de-identified] : looks well NAD [de-identified] : NCAT [de-identified] : CHAPMAN x 4

## 2023-08-24 NOTE — ASSESSMENT
[FreeTextEntry1] : 58 year old female with recurrent sigmoid diverticulitis including complicated episode with abscess. i have had a lengthy discussion with her and explained that given the numerous episodes of sigmoid diverticultis within a short period of time (6 months) and including a complicated episode with abscess i recoomend a laparoscopic sigmoid colon resection. risk and benefits include bleeding, infections, sepsis, dvt, pe, mi, hernias, recurrence, leak from anastamosis, need for stoma and death.   8/24/23 Here for POV s/p lap sigmoidectomy on 8/9/23. Doing well. Liberalize diet. Cont avoiding heavy lifting for another 2 weeks. OK to return to work in 1 month. F/u prn.

## 2023-08-24 NOTE — REASON FOR VISIT
[Post Op: _________] : a [unfilled] post op visit [FreeTextEntry1] : 8/8/23 Lap sigmoidectomy  [Initial Evaluation] : an initial evaluation

## 2023-08-24 NOTE — HISTORY OF PRESENT ILLNESS
[FreeTextEntry1] : Initial office visit for this 58 year old female with recurrent sigmoid diverticulitis with complication of abscess. she has had 4 episodes of diverticulitis since january of this year including one with abscess. she has been treated with extensive antibiotics.   8/24/23 Here for POV after undergoing on 8/9/23 Lap sigmoid for diverticulitis. Doing well. Gricel po, passing gas and stool. Energy levels are improving.

## 2024-01-05 ENCOUNTER — APPOINTMENT (OUTPATIENT)
Dept: COLORECTAL SURGERY | Facility: CLINIC | Age: 59
End: 2024-01-05
Payer: COMMERCIAL

## 2024-01-05 VITALS
BODY MASS INDEX: 23.57 KG/M2 | HEIGHT: 63 IN | HEART RATE: 109 BPM | DIASTOLIC BLOOD PRESSURE: 86 MMHG | WEIGHT: 133 LBS | SYSTOLIC BLOOD PRESSURE: 140 MMHG | RESPIRATION RATE: 14 BRPM

## 2024-01-05 PROCEDURE — 99213 OFFICE O/P EST LOW 20 MIN: CPT

## 2024-01-05 NOTE — PHYSICAL EXAM
[JVD] : no jugular venous distention  [Respiratory Effort] : normal respiratory effort [Normal Rate and Rhythm] : normal rate and rhythm [No Edema] : No edema [No Rash or Lesion] : No rash or lesion [Alert] : alert [Oriented to Person] : oriented to person [Oriented to Place] : oriented to place [Oriented to Time] : oriented to time [Calm] : calm [de-identified] : Soft, nondistended [de-identified] : External exam - no fistula, excoriation, or condyloma.  At posterior midline there is a 3mm superficial anal fissure, tenderness elicited with probing with cotton-tipped applicator.  Mild tenderness also at anterior midline.  ADDI and anoscopy deferred for now due to pain. [de-identified] : No distress [de-identified] : Normocephalic

## 2024-01-05 NOTE — HISTORY OF PRESENT ILLNESS
[FreeTextEntry1] : 58yoF s/p lap sigmoid colectomy for diverticulitis by Dr. Knowles in 8/2023, here with complaints of perianal discomfort with BMs.  She notes that she does go frequently throughout the day, loose consistency.  Has a fiber supplement at home but has not started taking it.  No fever/chills.  No N/V.  Describes the pain as slightly sharp.

## 2024-01-05 NOTE — PLAN
[TextEntry] : -- Discussed with patient that she should begin a powder fiber supplement (Metamucil, Benefiber, psyllium, etc).  She was also advised to drink at least 8 glasses of water daily in addition. -- Miralax and stool softener may be added as needed -- Nifedipine for anal fissure -- For pain: can use Tylenol/ibuprofen, Sitz baths, topical lidocaine -- Follow up 6-8 weeks for repeat evaluation, full anorectal exam

## 2024-03-01 ENCOUNTER — APPOINTMENT (OUTPATIENT)
Dept: COLORECTAL SURGERY | Facility: CLINIC | Age: 59
End: 2024-03-01
Payer: COMMERCIAL

## 2024-03-01 VITALS
HEIGHT: 63 IN | RESPIRATION RATE: 14 BRPM | DIASTOLIC BLOOD PRESSURE: 83 MMHG | BODY MASS INDEX: 23.92 KG/M2 | WEIGHT: 135 LBS | SYSTOLIC BLOOD PRESSURE: 138 MMHG | HEART RATE: 92 BPM

## 2024-03-01 DIAGNOSIS — K60.2 ANAL FISSURE, UNSPECIFIED: ICD-10-CM

## 2024-03-01 PROCEDURE — 99213 OFFICE O/P EST LOW 20 MIN: CPT | Mod: 25

## 2024-03-01 PROCEDURE — 46600 DIAGNOSTIC ANOSCOPY SPX: CPT

## 2024-03-01 NOTE — PROCEDURE
[FreeTextEntry1] : After informed consent was obtained, a lubricated lighted anoscope was inserted into the anal canal to evaluate for masses. The canal was inspected circumferentially up to the level above the dentate line.  The scope was withdrawn. The patient tolerated the procedure well.

## 2024-03-01 NOTE — HISTORY OF PRESENT ILLNESS
[FreeTextEntry1] : 3/1/2024: Patient feels well - has improved significantly in the last week.  No further pain, no bleeding.  Did have pelvic floor spasm that resolved with more consistent use of Sitz baths last week and she feels this helped significantly.  1/5/2024: 58yoF s/p lap sigmoid colectomy for diverticulitis by Dr. Knowles in 8/2023, here with complaints of perianal discomfort with BMs.  She notes that she does go frequently throughout the day, loose consistency.  Has a fiber supplement at home but has not started taking it.  No fever/chills.  No N/V.  Describes the pain as slightly sharp.

## 2024-03-01 NOTE — PHYSICAL EXAM
[JVD] : no jugular venous distention  [Respiratory Effort] : normal respiratory effort [Normal Rate and Rhythm] : normal rate and rhythm [No Edema] : No edema [No Rash or Lesion] : No rash or lesion [Oriented to Person] : oriented to person [Alert] : alert [Oriented to Time] : oriented to time [Oriented to Place] : oriented to place [Calm] : calm [de-identified] : Soft, nondistended [de-identified] : No distress [de-identified] : External exam - no fistula, excoriation, or condyloma.  At posterior midline there is a healed anal fissure, nontender.  ADDI and anoscopy normal.  [de-identified] : Normocephalic

## 2024-03-01 NOTE — PLAN
[TextEntry] : -- Patient has recovered well from her anal fissure -- Continue bowel regimen - advised regarding importance of this.  She is focusing on taking plenty of dietary fiber as the powder supplement cause constipation, and she is trying to drink more water. -- Can stop nifedipine -- Follow up as needed for recurrence of symptoms.

## 2024-09-18 NOTE — H&P PST ADULT - DP PULSE
----- Message from Javan CRAWFORD sent at 9/18/2024  8:44 AM EDT -----  Regarding: ECC Message to Provider  ECC Message to Provider    Relationship to Patient: Self     Additional Information Pt said that he missed 2 appointments with Syed Stephens and he said that [facility] in the [facility] in [place] and he's tryin to get situated and he will give callback to the doctor to reschedule the appt he missed and he said that don't have a phone that's why he missed the appointment    --------------------------------------------------------------------------------------------------------------------------    Call Back Information: OK to leave message on voicemail  Preferred Call Back Number: Phone  [phone]   right normal/left normal